# Patient Record
Sex: FEMALE | Race: WHITE | NOT HISPANIC OR LATINO | URBAN - METROPOLITAN AREA
[De-identification: names, ages, dates, MRNs, and addresses within clinical notes are randomized per-mention and may not be internally consistent; named-entity substitution may affect disease eponyms.]

---

## 2020-08-18 NOTE — PATIENT DISCUSSION
PT HAS SOME DAYS THAT SHE HAS REDNESS AND IRRITATION IN RIGHT EYE - HAS HAD TEAR DUCT REBUILT OD.  ? 2ND TO ERROSION.

## 2021-02-22 ENCOUNTER — IMPORTED ENCOUNTER (OUTPATIENT)
Dept: URBAN - METROPOLITAN AREA CLINIC 50 | Facility: CLINIC | Age: 31
End: 2021-02-22

## 2021-02-22 NOTE — PATIENT DISCUSSION
"""Educated patient on today's findings. Finalized Rx given today. RTC 1 year.  RTC if any changes ""

## 2021-03-09 ENCOUNTER — IMPORTED ENCOUNTER (OUTPATIENT)
Dept: URBAN - METROPOLITAN AREA CLINIC 50 | Facility: CLINIC | Age: 31
End: 2021-03-09

## 2021-03-09 NOTE — PATIENT DISCUSSION
"""Patient given final contact lens prescription.  3/9/2021""""Patient given finalized contact lens ""

## 2021-04-17 ASSESSMENT — VISUAL ACUITY
OS_CC: J1+@ 16 IN
OD_CC: 20/20-1
OS_CC: 20/20
OD_CC: J1+@ 16 IN

## 2021-04-17 ASSESSMENT — TONOMETRY
OD_IOP_MMHG: 13
OS_IOP_MMHG: 13

## 2022-08-24 NOTE — PATIENT DISCUSSION
8 24 22 HAD NEW TEAR DUCT CREATED AT [facility] OD  - IMPROVED AFTERWARDS - OS SYMPTOMATIC - RECOM EVAL DR PEÑA.